# Patient Record
Sex: FEMALE | Race: WHITE | NOT HISPANIC OR LATINO | URBAN - NONMETROPOLITAN AREA
[De-identification: names, ages, dates, MRNs, and addresses within clinical notes are randomized per-mention and may not be internally consistent; named-entity substitution may affect disease eponyms.]

---

## 2021-05-19 ENCOUNTER — IMPORTED ENCOUNTER (OUTPATIENT)
Dept: URBAN - NONMETROPOLITAN AREA CLINIC 1 | Facility: CLINIC | Age: 66
End: 2021-05-19

## 2021-05-19 PROBLEM — H52.222: Noted: 2021-05-19

## 2021-05-19 PROBLEM — H52.4: Noted: 2021-05-19

## 2021-05-19 PROBLEM — H52.01: Noted: 2021-05-19

## 2021-05-19 PROBLEM — H52.12: Noted: 2021-05-19

## 2021-05-19 PROBLEM — H04.123: Noted: 2021-05-19

## 2021-05-19 PROCEDURE — 92015 DETERMINE REFRACTIVE STATE: CPT

## 2021-05-19 PROCEDURE — 92004 COMPRE OPH EXAM NEW PT 1/>: CPT

## 2021-05-19 NOTE — PATIENT DISCUSSION
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

## 2021-10-12 ENCOUNTER — IMPORTED ENCOUNTER (OUTPATIENT)
Dept: URBAN - NONMETROPOLITAN AREA CLINIC 1 | Facility: CLINIC | Age: 66
End: 2021-10-12

## 2021-10-12 PROCEDURE — 92134 CPTRZ OPH DX IMG PST SGM RTA: CPT

## 2021-10-12 NOTE — PATIENT DISCUSSION
PVD OU-  discussed findings w/patient-  OCT Mac done 10/12/2021    OD: PVD no holes tears detachments    OS: PVD no holes tears detachments-  Retina flat 360 with no breaks tears or heme. -  S&S of RD/RT reviewed with pt. -  Stressed that pt should contact our office right away with any changes or increase in symptoms.-  RTC 2 weeks f/u PVD; 's Notes: MR 5/19/2021DFE 10/12/2021OCT Mac 10/12/2021

## 2021-10-14 PROBLEM — H52.01: Noted: 2021-10-14

## 2021-10-14 PROBLEM — H52.4: Noted: 2021-10-14

## 2021-10-14 PROBLEM — H52.12: Noted: 2021-10-14

## 2021-10-14 PROBLEM — H52.222: Noted: 2021-10-14

## 2021-10-14 PROBLEM — H43.813: Noted: 2021-10-14

## 2021-10-14 PROBLEM — H04.123: Noted: 2021-10-14

## 2022-04-09 ASSESSMENT — VISUAL ACUITY
OU_SC: 20/30
OS_SC: 20/60
OU_CC: 20/20
OD_SC: 20/20
OD_SC: 20/30
OS_SC: 20/25+1
OU_CC: 20/30

## 2022-04-09 ASSESSMENT — TONOMETRY
OD_IOP_MMHG: 15
OD_IOP_MMHG: 16
OS_IOP_MMHG: 16
OS_IOP_MMHG: 15

## 2025-08-25 ENCOUNTER — COMPREHENSIVE EXAM (OUTPATIENT)
Age: 70
End: 2025-08-25

## 2025-08-25 DIAGNOSIS — H52.4: ICD-10-CM

## 2025-08-25 DIAGNOSIS — H52.01: ICD-10-CM

## 2025-08-25 DIAGNOSIS — H25.13: ICD-10-CM

## 2025-08-25 DIAGNOSIS — H52.12: ICD-10-CM

## 2025-08-25 DIAGNOSIS — H52.222: ICD-10-CM

## 2025-08-25 DIAGNOSIS — H43.813: ICD-10-CM

## 2025-08-25 PROCEDURE — 92015 DETERMINE REFRACTIVE STATE: CPT

## 2025-08-25 PROCEDURE — 92014 COMPRE OPH EXAM EST PT 1/>: CPT
